# Patient Record
(demographics unavailable — no encounter records)

---

## 2024-11-08 NOTE — HISTORY OF PRESENT ILLNESS
[de-identified] : Follow up finger fracture [FreeTextEntry6] : 13 [facility] for a right index finger injury. Xray showed possible fracture.  Was wearing a finger splint for past 2 weeks, now stopped wearing it as all her symptoms are gone. No swelling, no bruising, no pain of affected finger. Able to move it without difficulty.

## 2024-11-08 NOTE — PHYSICAL EXAM
[NL] : warm, clear [de-identified] : Full ROM of right index finger. No swelling, bruising noted. No tenderness to palpation

## 2024-11-08 NOTE — DISCUSSION/SUMMARY
[] : The components of the vaccine(s) to be administered today are listed in the plan of care. The disease(s) for which the vaccine(s) are intended to prevent and the risks have been discussed with the caretaker.  The risks are also included in the appropriate vaccination information statements which have been provided to the patient's caregiver.  The caregiver has given consent to vaccinate. [FreeTextEntry1] : 13 year old female here for flu vaccine and to follow up right index finger injury with possible fracture. Unremarkable exam of right hand today.   -RTC if any concerns

## 2025-02-19 NOTE — HISTORY OF PRESENT ILLNESS
[FreeTextEntry6] : patient is here today for follow up after ER visit for influenza B.  she was given iv fluids . there has been no n/v/d/rash or fever for over 5 days.  her cough persists. She was given Brom fed and  ibuprophen and a mouthwash to gargle with.  her appetite has been down for solids but she is drinking well. mom is concerned that her appetite has been down for solids

## 2025-04-02 NOTE — DISCUSSION/SUMMARY
[FreeTextEntry1] : 13 year old female with symptoms listed above. Well appearing adolescent on exam at this time. Rapid strep negative. Likely viral syndrome.  -Follow up throat culture -Recommend supportive care -Call/RTC if new/worsening/persistent symptoms for re-evaluation

## 2025-04-02 NOTE — HISTORY OF PRESENT ILLNESS
[de-identified] : Runny nose and sore throat  [FreeTextEntry6] : 13 year old female presenting with runny nose and sore throat for past 2 days. No fevers. No N/V/D, headache, ear pain, abdominal pain reported. Remains energetic otherwise.